# Patient Record
Sex: MALE | Race: WHITE | NOT HISPANIC OR LATINO
[De-identification: names, ages, dates, MRNs, and addresses within clinical notes are randomized per-mention and may not be internally consistent; named-entity substitution may affect disease eponyms.]

---

## 2019-01-14 PROBLEM — Z00.00 ENCOUNTER FOR PREVENTIVE HEALTH EXAMINATION: Status: ACTIVE | Noted: 2019-01-14

## 2019-02-14 ENCOUNTER — APPOINTMENT (OUTPATIENT)
Dept: OTOLARYNGOLOGY | Facility: CLINIC | Age: 64
End: 2019-02-14
Payer: COMMERCIAL

## 2019-02-14 VITALS
BODY MASS INDEX: 26.92 KG/M2 | WEIGHT: 188 LBS | HEIGHT: 70 IN | DIASTOLIC BLOOD PRESSURE: 70 MMHG | SYSTOLIC BLOOD PRESSURE: 110 MMHG | OXYGEN SATURATION: 98 % | HEART RATE: 70 BPM

## 2019-02-14 DIAGNOSIS — Z87.891 PERSONAL HISTORY OF NICOTINE DEPENDENCE: ICD-10-CM

## 2019-02-14 DIAGNOSIS — Z80.0 FAMILY HISTORY OF MALIGNANT NEOPLASM OF DIGESTIVE ORGANS: ICD-10-CM

## 2019-02-14 DIAGNOSIS — Z80.3 FAMILY HISTORY OF MALIGNANT NEOPLASM OF BREAST: ICD-10-CM

## 2019-02-14 DIAGNOSIS — Z80.42 FAMILY HISTORY OF MALIGNANT NEOPLASM OF PROSTATE: ICD-10-CM

## 2019-02-14 PROCEDURE — 99243 OFF/OP CNSLTJ NEW/EST LOW 30: CPT

## 2019-02-14 RX ORDER — MULTIVIT-MIN/FOLIC/VIT K/LYCOP 400-300MCG
50 MCG TABLET ORAL
Refills: 0 | Status: ACTIVE | COMMUNITY

## 2019-02-14 RX ORDER — VITAMIN B COMPLEX
TABLET ORAL
Refills: 0 | Status: ACTIVE | COMMUNITY

## 2019-02-14 RX ORDER — OMEPRAZOLE 40 MG/1
40 CAPSULE, DELAYED RELEASE ORAL
Refills: 0 | Status: ACTIVE | COMMUNITY

## 2019-02-14 RX ORDER — RANITIDINE 150 MG/1
150 TABLET ORAL
Refills: 0 | Status: ACTIVE | COMMUNITY

## 2019-02-14 NOTE — CONSULT LETTER
[Dear  ___] : Dear  [unfilled], [Consult Letter:] : I had the pleasure of evaluating your patient, [unfilled]. [Please see my note below.] : Please see my note below. [Consult Closing:] : Thank you very much for allowing me to participate in the care of this patient.  If you have any questions, please do not hesitate to contact me. [Sincerely,] : Sincerely, [FreeTextEntry2] : Presley Mckeon MD [FreeTextEntry3] : Timothy Nuñez MD, FACS\par Clinical \par Dept. of Otolaryngology\par [facility]\par

## 2019-02-14 NOTE — ASSESSMENT
[FreeTextEntry1] : Will get Audio and tymp.  Pt to see Rose Wood for a tinnitus management eval.  If there is any asymmetry on the audiogram pt may need an MRI of the IAC's.  \par \par If dizziness continues to be an issue we will obtain an ENG.

## 2019-02-14 NOTE — HISTORY OF PRESENT ILLNESS
[de-identified] : Age: 63\par Gender: M\par Main complaint: Tinnitus.  It comes and goes.  \par Associated symptoms: Mild hearing loss and dizziness\par Duration/Timing: Past year.\par Severity of symptoms: Severe\par Modifying factors: Tried Lipoflavinoids.  Did not help\par \par \par

## 2019-04-22 ENCOUNTER — RESULT REVIEW (OUTPATIENT)
Age: 64
End: 2019-04-22

## 2019-05-28 ENCOUNTER — RESULT REVIEW (OUTPATIENT)
Age: 64
End: 2019-05-28

## 2020-03-02 ENCOUNTER — APPOINTMENT (OUTPATIENT)
Dept: OTOLARYNGOLOGY | Facility: CLINIC | Age: 65
End: 2020-03-02
Payer: COMMERCIAL

## 2020-03-02 VITALS
BODY MASS INDEX: 29.35 KG/M2 | HEIGHT: 70 IN | SYSTOLIC BLOOD PRESSURE: 122 MMHG | WEIGHT: 205 LBS | DIASTOLIC BLOOD PRESSURE: 60 MMHG

## 2020-03-02 DIAGNOSIS — R42 DIZZINESS AND GIDDINESS: ICD-10-CM

## 2020-03-02 DIAGNOSIS — H90.5 UNSPECIFIED SENSORINEURAL HEARING LOSS: ICD-10-CM

## 2020-03-02 DIAGNOSIS — R09.89 OTHER SPECIFIED SYMPTOMS AND SIGNS INVOLVING THE CIRCULATORY AND RESPIRATORY SYSTEMS: ICD-10-CM

## 2020-03-02 DIAGNOSIS — H93.13 TINNITUS, BILATERAL: ICD-10-CM

## 2020-03-02 DIAGNOSIS — K21.9 GASTRO-ESOPHAGEAL REFLUX DISEASE W/OUT ESOPHAGITIS: ICD-10-CM

## 2020-03-02 PROCEDURE — 99215 OFFICE O/P EST HI 40 MIN: CPT | Mod: 25

## 2020-03-02 PROCEDURE — 31575 DIAGNOSTIC LARYNGOSCOPY: CPT

## 2020-03-02 RX ORDER — ATORVASTATIN CALCIUM 10 MG/1
10 TABLET, FILM COATED ORAL
Refills: 0 | Status: ACTIVE | COMMUNITY

## 2020-03-02 NOTE — HISTORY OF PRESENT ILLNESS
[de-identified] : Pt is here with a new complaint of snoring and L sided throat discomfort and a globus sensation. Symptoms have been present for the past few years and are getting worse.  Pt continues to c/o hearing loss and tinnitus.  He also reports intermittent vertigo symptoms.

## 2020-03-02 NOTE — PROCEDURE
[Unable to Cooperate with Mirror] : patient unable to cooperate with mirror [Oxymetazoline HCl] : oxymetazoline HCl [Topical Lidocaine] : topical lidocaine [Globus] : globus [Flexible Endoscope] : examined with the flexible endoscope [Serial Number: ___] : Serial Number: [unfilled] [True Vocal Cords Paralysis] : no true vocal cord paralysis [True Vocal Cords Erythematous] : no true vocal cord edema [True Vocal Cords Hernadez's Nodules] : no true vocal cord nodules [Glottis Arytenoid Cartilages] : no arytenoid ulcerations [Glottis Arytenoid Cartilages Erythema] : no arytenoid erythema [Normal] : posterior cricoid area had healthy pink mucosa in the interarytenoid area and the esophageal inlet [Present] : absent

## 2020-03-02 NOTE — CONSULT LETTER
[Dear  ___] : Dear  [unfilled], [Courtesy Letter:] : I had the pleasure of seeing your patient, [unfilled], in my office today. [Please see my note below.] : Please see my note below. [FreeTextEntry2] : Presley Mckeon MD [Consult Closing:] : Thank you very much for allowing me to participate in the care of this patient.  If you have any questions, please do not hesitate to contact me. [Sincerely,] : Sincerely, [FreeTextEntry3] : Timothy Nuñez MD, FACS\par \par Dept. of Otolaryngology and Head & Neck Surgery\par [facility]\par  [DrLance  ___] : Dr. CARLIN

## 2022-04-15 ENCOUNTER — APPOINTMENT (OUTPATIENT)
Dept: ORTHOPEDIC SURGERY | Facility: CLINIC | Age: 67
End: 2022-04-15

## 2022-09-27 ENCOUNTER — APPOINTMENT (OUTPATIENT)
Dept: ORTHOPEDIC SURGERY | Facility: CLINIC | Age: 67
End: 2022-09-27

## 2022-09-27 VITALS — WEIGHT: 195 LBS | HEIGHT: 70 IN | BODY MASS INDEX: 27.92 KG/M2

## 2022-09-27 DIAGNOSIS — Z78.9 OTHER SPECIFIED HEALTH STATUS: ICD-10-CM

## 2022-09-27 PROCEDURE — 99072 ADDL SUPL MATRL&STAF TM PHE: CPT

## 2022-09-27 PROCEDURE — 99215 OFFICE O/P EST HI 40 MIN: CPT

## 2022-09-27 PROCEDURE — 73564 X-RAY EXAM KNEE 4 OR MORE: CPT | Mod: 50

## 2022-09-27 RX ORDER — ROSUVASTATIN CALCIUM 5 MG/1
TABLET, FILM COATED ORAL
Refills: 0 | Status: ACTIVE | COMMUNITY

## 2022-09-27 NOTE — ASSESSMENT
[FreeTextEntry1] : ADVANCED OA L KNEE WITH VARUS ALIGNMENT DUE TO WORK RELATED. UPDATED XRAYS SHOW BILATERAL KNEE OA WITH VARUS DEFORMITY. CONTINUED PAIN, SOME RELIEF WITH ORTHOVISC. WE AGAIN DISCUSSED TREATMENT OPTIONS. WILL AUTH FOR B/L ORTHOVISC. THIS INJECTION IS MEDICALLY NECESSARY DUE TO SEVERE PAIN AND OA.

## 2022-09-27 NOTE — PHYSICAL EXAM
[Bilateral] : knee bilaterally [5___] : hamstring 5[unfilled]/5 [] : patient ambulates without assistive device [TWNoteComboBox7] : flexion 130 degrees [de-identified] : extension 0 degrees

## 2022-11-15 ENCOUNTER — APPOINTMENT (OUTPATIENT)
Dept: ORTHOPEDIC SURGERY | Facility: CLINIC | Age: 67
End: 2022-11-15

## 2022-11-15 PROCEDURE — 99072 ADDL SUPL MATRL&STAF TM PHE: CPT

## 2022-11-15 PROCEDURE — 99214 OFFICE O/P EST MOD 30 MIN: CPT | Mod: 25

## 2022-11-15 PROCEDURE — 20610 DRAIN/INJ JOINT/BURSA W/O US: CPT | Mod: 50

## 2022-11-15 NOTE — HISTORY OF PRESENT ILLNESS
[8] : 8 [2] : 2 [Intermittent] : intermittent [Household chores] : household chores [Injection therapy] : injection therapy [Walking] : walking [Retired] : Work status: retired [] : yes [de-identified] : 11/15/22 FOLLOW UP WORKERS COMP  BL KNEES  [FreeTextEntry1] : BILATERAL KNEES  [de-identified] : NOTHING

## 2022-11-15 NOTE — PROCEDURE
[de-identified] : Procedure Name: Orthovisc (Large Joint)\par \par Viscosupplementation Injection: X-ray evidence of Osteoarthritis on this or prior visit, Patient has tried OTC's including aspirin, Ibuprofen, Aleve etc or prescription NSAIDS, and/or exercises at home and/ or physical therapy without satisfactory response and Repeat series performed because patient had significant improvement in their pain and functional capacity from prior series which was given more than six months ago. \par \par An injection of Orthovisc 2ml #1 was injected into the bilateral knee(s). The risks, benefits, and alternatives to Viscosupplementation injection were explained in full to the patient. Risks outlined include but are not limited to infection, sepsis, bleeding, scarring, skin discoloration, temporary increase in pain, syncopal episode, failure to resolve symptoms, allergic reaction, and symptom recurrence. Signs and symptoms of infection reviewed and patient advised to call immediately for redness, fevers, and/or chills. Patient understood the risks. All questions were answered. After discussion of options, patient requested Viscosupplementation. Oral informed consent was obtained and sterile prep was done of the injection site. The patient tolerated the procedure well. Ice tonight to the injection site. \par

## 2022-11-15 NOTE — ASSESSMENT
[FreeTextEntry1] : ADVANCED OA L KNEE WITH VARUS ALIGNMENT DUE TO WORK RELATED. UPDATED XRAYS SHOW BILATERAL KNEE OA WITH VARUS DEFORMITY. CONTINUED PAIN, SOME RELIEF WITH ORTHOVISC. WE AGAIN DISCUSSED TREATMENT OPTIONS. \par B/L KNEE ORTHOVISC #1 TODAY. PATIENT TOLERATED INJECTION WELL.

## 2022-11-15 NOTE — PHYSICAL EXAM
[Bilateral] : knee bilaterally [5___] : hamstring 5[unfilled]/5 [] : no erythema [TWNoteComboBox7] : flexion 130 degrees [de-identified] : extension 0 degrees

## 2022-11-22 ENCOUNTER — APPOINTMENT (OUTPATIENT)
Dept: ORTHOPEDIC SURGERY | Facility: CLINIC | Age: 67
End: 2022-11-22

## 2022-11-22 PROCEDURE — 99214 OFFICE O/P EST MOD 30 MIN: CPT | Mod: 25

## 2022-11-22 PROCEDURE — 99072 ADDL SUPL MATRL&STAF TM PHE: CPT

## 2022-11-22 PROCEDURE — 20610 DRAIN/INJ JOINT/BURSA W/O US: CPT | Mod: 50

## 2022-11-22 NOTE — PROCEDURE
[de-identified] : Procedure Name: Orthovisc (Large Joint)\par \par Viscosupplementation Injection: X-ray evidence of Osteoarthritis on this or prior visit, Patient has tried OTC's including aspirin, Ibuprofen, Aleve etc or prescription NSAIDS, and/or exercises at home and/ or physical therapy without satisfactory response and Repeat series performed because patient had significant improvement in their pain and functional capacity from prior series which was given more than six months ago. \par \par An injection of Orthovisc 2ml #2 was injected into the bilateral knee(s). The risks, benefits, and alternatives to Viscosupplementation injection were explained in full to the patient. Risks outlined include but are not limited to infection, sepsis, bleeding, scarring, skin discoloration, temporary increase in pain, syncopal episode, failure to resolve symptoms, allergic reaction, and symptom recurrence. Signs and symptoms of infection reviewed and patient advised to call immediately for redness, fevers, and/or chills. Patient understood the risks. All questions were answered. After discussion of options, patient requested Viscosupplementation. Oral informed consent was obtained and sterile prep was done of the injection site. The patient tolerated the procedure well. Ice tonight to the injection site. \par

## 2022-11-22 NOTE — PHYSICAL EXAM
[Bilateral] : knee bilaterally [5___] : hamstring 5[unfilled]/5 [] : no erythema [TWNoteComboBox7] : flexion 130 degrees [de-identified] : extension 0 degrees

## 2022-11-22 NOTE — HISTORY OF PRESENT ILLNESS
[8] : 8 [2] : 2 [Intermittent] : intermittent [Household chores] : household chores [Injection therapy] : injection therapy [Walking] : walking [Retired] : Work status: retired [] : yes [de-identified] : 11/15/22 FOLLOW UP WORKERS COMP  BL KNEES \par \par 11/22/22 bl knees orthovisc injections # 2  [FreeTextEntry1] : BILATERAL KNEES  [de-identified] : orthovisc injections

## 2022-11-29 ENCOUNTER — APPOINTMENT (OUTPATIENT)
Dept: ORTHOPEDIC SURGERY | Facility: CLINIC | Age: 67
End: 2022-11-29

## 2022-11-29 PROCEDURE — 99214 OFFICE O/P EST MOD 30 MIN: CPT | Mod: 25

## 2022-11-29 PROCEDURE — 20610 DRAIN/INJ JOINT/BURSA W/O US: CPT | Mod: 50

## 2022-11-29 PROCEDURE — 99072 ADDL SUPL MATRL&STAF TM PHE: CPT

## 2022-11-29 NOTE — HISTORY OF PRESENT ILLNESS
[8] : 8 [2] : 2 [Intermittent] : intermittent [Household chores] : household chores [Injection therapy] : injection therapy [Walking] : walking [Retired] : Work status: retired [] : yes [de-identified] : 11/15/22 FOLLOW UP WORKERS COMP  BL KNEES \par \par 11/22/22 bl knees orthovisc injections # 2 \par \par 11/29/22 bl knees orthovisc injections # 3  [FreeTextEntry1] : BILATERAL KNEES  [de-identified] : orthovisc injections

## 2022-11-29 NOTE — PHYSICAL EXAM
[Bilateral] : knee bilaterally [5___] : hamstring 5[unfilled]/5 [] : no erythema [TWNoteComboBox7] : flexion 130 degrees [de-identified] : extension 0 degrees

## 2022-11-29 NOTE — ASSESSMENT
[FreeTextEntry1] : ADVANCED OA L KNEE WITH VARUS ALIGNMENT DUE TO WORK RELATED. UPDATED XRAYS SHOW BILATERAL KNEE OA WITH VARUS DEFORMITY. CONTINUED PAIN, SOME RELIEF WITH ORTHOVISC. WE AGAIN DISCUSSED TREATMENT OPTIONS. \par B/L KNEE ORTHOVISC #3 TODAY. PATIENT TOLERATED INJECTION WELL.

## 2022-11-29 NOTE — PROCEDURE
[de-identified] : Procedure Name: Orthovisc (Large Joint)\par \par Viscosupplementation Injection: X-ray evidence of Osteoarthritis on this or prior visit, Patient has tried OTC's including aspirin, Ibuprofen, Aleve etc or prescription NSAIDS, and/or exercises at home and/ or physical therapy without satisfactory response and Repeat series performed because patient had significant improvement in their pain and functional capacity from prior series which was given more than six months ago. \par \par An injection of Orthovisc 2ml #3 was injected into the bilateral knee(s). The risks, benefits, and alternatives to Viscosupplementation injection were explained in full to the patient. Risks outlined include but are not limited to infection, sepsis, bleeding, scarring, skin discoloration, temporary increase in pain, syncopal episode, failure to resolve symptoms, allergic reaction, and symptom recurrence. Signs and symptoms of infection reviewed and patient advised to call immediately for redness, fevers, and/or chills. Patient understood the risks. All questions were answered. After discussion of options, patient requested Viscosupplementation. Oral informed consent was obtained and sterile prep was done of the injection site. The patient tolerated the procedure well. Ice tonight to the injection site. \par

## 2022-12-06 ENCOUNTER — APPOINTMENT (OUTPATIENT)
Dept: ORTHOPEDIC SURGERY | Facility: CLINIC | Age: 67
End: 2022-12-06

## 2022-12-06 PROCEDURE — 20610 DRAIN/INJ JOINT/BURSA W/O US: CPT | Mod: 50

## 2022-12-06 PROCEDURE — 99072 ADDL SUPL MATRL&STAF TM PHE: CPT

## 2022-12-06 PROCEDURE — 99214 OFFICE O/P EST MOD 30 MIN: CPT | Mod: 25

## 2022-12-06 NOTE — PROCEDURE
[de-identified] : Procedure Name: Orthovisc (Large Joint)\par \par Viscosupplementation Injection: X-ray evidence of Osteoarthritis on this or prior visit, Patient has tried OTC's including aspirin, Ibuprofen, Aleve etc or prescription NSAIDS, and/or exercises at home and/ or physical therapy without satisfactory response and Repeat series performed because patient had significant improvement in their pain and functional capacity from prior series which was given more than six months ago. \par \par An injection of Orthovisc 2ml #4 was injected into the bilateral knee(s). The risks, benefits, and alternatives to Viscosupplementation injection were explained in full to the patient. Risks outlined include but are not limited to infection, sepsis, bleeding, scarring, skin discoloration, temporary increase in pain, syncopal episode, failure to resolve symptoms, allergic reaction, and symptom recurrence. Signs and symptoms of infection reviewed and patient advised to call immediately for redness, fevers, and/or chills. Patient understood the risks. All questions were answered. After discussion of options, patient requested Viscosupplementation. Oral informed consent was obtained and sterile prep was done of the injection site. The patient tolerated the procedure well. Ice tonight to the injection site. \par

## 2022-12-06 NOTE — HISTORY OF PRESENT ILLNESS
[8] : 8 [2] : 2 [Intermittent] : intermittent [Household chores] : household chores [Injection therapy] : injection therapy [Walking] : walking [Retired] : Work status: retired [] : yes [de-identified] : 11/15/22 FOLLOW UP WORKERS COMP  BL KNEES \par \par 11/22/22 bl knees orthovisc injections # 2 \par \par 11/29/22 bl knees orthovisc injections # 3 \par \par 12/06/22 bl knees orthovisc # 4 [FreeTextEntry1] : BILATERAL KNEES  [de-identified] : orthovisc injections

## 2022-12-06 NOTE — ASSESSMENT
[FreeTextEntry1] : ADVANCED OA L KNEE WITH VARUS ALIGNMENT DUE TO WORK RELATED. UPDATED XRAYS SHOW BILATERAL KNEE OA WITH VARUS DEFORMITY. CONTINUED PAIN, SOME RELIEF WITH ORTHOVISC. WE AGAIN DISCUSSED TREATMENT OPTIONS. \par B/L KNEE ORTHOVISC #4 TODAY. PATIENT TOLERATED INJECTION WELL.

## 2022-12-06 NOTE — PHYSICAL EXAM
[Bilateral] : knee bilaterally [5___] : hamstring 5[unfilled]/5 [] : no erythema [TWNoteComboBox7] : flexion 130 degrees [de-identified] : extension 0 degrees

## 2023-08-15 ENCOUNTER — APPOINTMENT (OUTPATIENT)
Dept: ORTHOPEDIC SURGERY | Facility: CLINIC | Age: 68
End: 2023-08-15
Payer: OTHER MISCELLANEOUS

## 2023-08-15 VITALS — WEIGHT: 195 LBS | BODY MASS INDEX: 27.92 KG/M2 | HEIGHT: 70 IN

## 2023-08-15 PROCEDURE — 20610 DRAIN/INJ JOINT/BURSA W/O US: CPT | Mod: 50

## 2023-08-15 PROCEDURE — 99215 OFFICE O/P EST HI 40 MIN: CPT | Mod: 25

## 2023-08-15 NOTE — ASSESSMENT
[FreeTextEntry1] : ADVANCED OA B/L KNEE WITH VARUS ALIGNMENT DUE TO WORK RELATED INJURY. UPDATED XRAYS SHOW BILATERAL KNEE OA WITH VARUS DEFORMITY. CONTINUED PAIN, WE AGAIN DISCUSSED TREATMENT OPTIONS. COMPLETED ORTHOVISC SERIES 12/6/22 WITH SOME RELIEF. QUESTIONS ANSWERED.   B/L KNEE ORTHOVISC #1 TODAY. PATIENT TOLERATED INJECTION WELL.

## 2023-08-15 NOTE — PHYSICAL EXAM
[Bilateral] : knee bilaterally [5___] : hamstring 5[unfilled]/5 [] : no erythema [TWNoteComboBox7] : flexion 130 degrees [de-identified] : extension 0 degrees

## 2023-08-15 NOTE — HISTORY OF PRESENT ILLNESS
[8] : 8 [2] : 2 [Intermittent] : intermittent [Household chores] : household chores [Injection therapy] : injection therapy [Walking] : walking [Retired] : Work status: retired [] : yes [de-identified] : 11/15/22 FOLLOW UP WORKERS COMP  BL KNEES   11/22/22 bl knees orthovisc injections # 2   11/29/22 bl knees orthovisc injections # 3   12/06/22 bl knees orthovisc # 4  8/15/23 Pt is here for bilateral knee follow up. Pain worsened recently and then got better, L>R. Finished orthovisc on 12/6/22 with good relief.  [FreeTextEntry1] : BILATERAL KNEES  [de-identified] : orthovisc injections

## 2023-08-15 NOTE — PROCEDURE
[de-identified] : Procedure Name: Orthovisc (Large Joint)  Viscosupplementation Injection: X-ray evidence of Osteoarthritis on this or prior visit, Patient has tried OTC's including aspirin, Ibuprofen, Aleve etc or prescription NSAIDS, and/or exercises at home and/ or physical therapy without satisfactory response and Repeat series performed because patient had significant improvement in their pain and functional capacity from prior series which was given more than six months ago.   An injection of Orthovisc 2ml #1 was injected into the bilateral knee(s). The risks, benefits, and alternatives to Viscosupplementation injection were explained in full to the patient. Risks outlined include but are not limited to infection, sepsis, bleeding, scarring, skin discoloration, temporary increase in pain, syncopal episode, failure to resolve symptoms, allergic reaction, and symptom recurrence. Signs and symptoms of infection reviewed and patient advised to call immediately for redness, fevers, and/or chills. Patient understood the risks. All questions were answered. After discussion of options, patient requested Viscosupplementation. Oral informed consent was obtained and sterile prep was done of the injection site. The patient tolerated the procedure well. Ice tonight to the injection site.

## 2023-08-22 ENCOUNTER — APPOINTMENT (OUTPATIENT)
Dept: ORTHOPEDIC SURGERY | Facility: CLINIC | Age: 68
End: 2023-08-22
Payer: OTHER MISCELLANEOUS

## 2023-08-22 VITALS — BODY MASS INDEX: 27.92 KG/M2 | WEIGHT: 195 LBS | HEIGHT: 70 IN

## 2023-08-22 PROCEDURE — 20610 DRAIN/INJ JOINT/BURSA W/O US: CPT | Mod: 50

## 2023-08-22 PROCEDURE — 99214 OFFICE O/P EST MOD 30 MIN: CPT | Mod: ACP,25

## 2023-08-22 NOTE — PHYSICAL EXAM
[Bilateral] : knee bilaterally [5___] : hamstring 5[unfilled]/5 [] : no erythema [TWNoteComboBox7] : flexion 130 degrees [de-identified] : extension 0 degrees

## 2023-08-22 NOTE — ASSESSMENT
[FreeTextEntry1] : ADVANCED OA B/L KNEE WITH VARUS ALIGNMENT DUE TO WORK RELATED INJURY. UPDATED XRAYS SHOW BILATERAL KNEE OA WITH VARUS DEFORMITY. CONTINUED PAIN, WE AGAIN DISCUSSED TREATMENT OPTIONS. COMPLETED ORTHOVISC SERIES 12/6/22 WITH SOME RELIEF. QUESTIONS ANSWERED.   B/L KNEE ORTHOVISC #2 TODAY. PATIENT TOLERATED INJECTION WELL.

## 2023-08-22 NOTE — PROCEDURE
[de-identified] : Procedure Name: Orthovisc (Large Joint)  Viscosupplementation Injection: X-ray evidence of Osteoarthritis on this or prior visit, Patient has tried OTC's including aspirin, Ibuprofen, Aleve etc or prescription NSAIDS, and/or exercises at home and/ or physical therapy without satisfactory response and Repeat series performed because patient had significant improvement in their pain and functional capacity from prior series which was given more than six months ago.   An injection of Orthovisc 2ml #2 was injected into the bilateral knee(s). The risks, benefits, and alternatives to Viscosupplementation injection were explained in full to the patient. Risks outlined include but are not limited to infection, sepsis, bleeding, scarring, skin discoloration, temporary increase in pain, syncopal episode, failure to resolve symptoms, allergic reaction, and symptom recurrence. Signs and symptoms of infection reviewed and patient advised to call immediately for redness, fevers, and/or chills. Patient understood the risks. All questions were answered. After discussion of options, patient requested Viscosupplementation. Oral informed consent was obtained and sterile prep was done of the injection site. The patient tolerated the procedure well. Ice tonight to the injection site.

## 2023-08-22 NOTE — HISTORY OF PRESENT ILLNESS
[8] : 8 [2] : 2 [Intermittent] : intermittent [Household chores] : household chores [Injection therapy] : injection therapy [Walking] : walking [Retired] : Work status: retired [] : yes [de-identified] : 11/15/22 FOLLOW UP WORKERS COMP  BL KNEES   11/22/22 bl knees orthovisc injections # 2   11/29/22 bl knees orthovisc injections # 3   12/06/22 bl knees orthovisc # 4  8/15/23 Pt is here for bilateral knee follow up. Pain worsened recently and then got better, L>R. Finished orthovisc on 12/6/22 with good relief.   08/22/23 BL KNEES ORTHOVISC # 2 [FreeTextEntry1] : BILATERAL KNEES  [de-identified] : orthovisc injections

## 2023-08-29 ENCOUNTER — APPOINTMENT (OUTPATIENT)
Dept: ORTHOPEDIC SURGERY | Facility: CLINIC | Age: 68
End: 2023-08-29
Payer: OTHER MISCELLANEOUS

## 2023-08-29 VITALS — WEIGHT: 195 LBS | HEIGHT: 70 IN | BODY MASS INDEX: 27.92 KG/M2

## 2023-08-29 PROCEDURE — 99213 OFFICE O/P EST LOW 20 MIN: CPT | Mod: 25

## 2023-08-29 PROCEDURE — 20610 DRAIN/INJ JOINT/BURSA W/O US: CPT | Mod: 50

## 2023-08-29 NOTE — HISTORY OF PRESENT ILLNESS
[8] : 8 [2] : 2 [Intermittent] : intermittent [Household chores] : household chores [Injection therapy] : injection therapy [Walking] : walking [Retired] : Work status: retired [] : yes [de-identified] : 11/15/22 FOLLOW UP WORKERS COMP  BL KNEES   11/22/22 bl knees orthovisc injections # 2   11/29/22 bl knees orthovisc injections # 3   12/06/22 bl knees orthovisc # 4  8/15/23 Pt is here for bilateral knee follow up. Pain worsened recently and then got better, L>R. Finished orthovisc on 12/6/22 with good relief.   08/22/23 BL KNEES ORTHOVISC # 2  08/29/23 bl knees orthovisc # 3  [FreeTextEntry1] : BILATERAL KNEES  [de-identified] : orthovisc injections

## 2023-08-29 NOTE — PHYSICAL EXAM
[Bilateral] : knee bilaterally [5___] : hamstring 5[unfilled]/5 [] : no erythema [TWNoteComboBox7] : flexion 130 degrees [de-identified] : extension 0 degrees

## 2023-08-29 NOTE — PROCEDURE
[de-identified] : Procedure Name: Orthovisc (Large Joint)  Viscosupplementation Injection: X-ray evidence of Osteoarthritis on this or prior visit, Patient has tried OTC's including aspirin, Ibuprofen, Aleve etc or prescription NSAIDS, and/or exercises at home and/ or physical therapy without satisfactory response and Repeat series performed because patient had significant improvement in their pain and functional capacity from prior series which was given more than six months ago.   An injection of Orthovisc 2ml #3 was injected into the bilateral knee(s). The risks, benefits, and alternatives to Viscosupplementation injection were explained in full to the patient. Risks outlined include but are not limited to infection, sepsis, bleeding, scarring, skin discoloration, temporary increase in pain, syncopal episode, failure to resolve symptoms, allergic reaction, and symptom recurrence. Signs and symptoms of infection reviewed and patient advised to call immediately for redness, fevers, and/or chills. Patient understood the risks. All questions were answered. After discussion of options, patient requested Viscosupplementation. Oral informed consent was obtained and sterile prep was done of the injection site. The patient tolerated the procedure well. Ice tonight to the injection site.

## 2023-08-29 NOTE — ASSESSMENT
[FreeTextEntry1] : ADVANCED OA B/L KNEE WITH VARUS ALIGNMENT DUE TO WORK RELATED INJURY. UPDATED XRAYS SHOW BILATERAL KNEE OA WITH VARUS DEFORMITY. CONTINUED PAIN, WE AGAIN DISCUSSED TREATMENT OPTIONS. COMPLETED ORTHOVISC SERIES 12/6/22 WITH SOME RELIEF. QUESTIONS ANSWERED.   B/L KNEE ORTHOVISC #3 TODAY. PATIENT TOLERATED INJECTION WELL.

## 2023-09-05 ENCOUNTER — APPOINTMENT (OUTPATIENT)
Dept: ORTHOPEDIC SURGERY | Facility: CLINIC | Age: 68
End: 2023-09-05
Payer: OTHER MISCELLANEOUS

## 2023-09-05 PROCEDURE — 99214 OFFICE O/P EST MOD 30 MIN: CPT | Mod: 25

## 2023-09-05 PROCEDURE — 20610 DRAIN/INJ JOINT/BURSA W/O US: CPT | Mod: 50

## 2023-09-05 NOTE — PROCEDURE
[de-identified] : Procedure Name: Orthovisc (Large Joint)  Viscosupplementation Injection: X-ray evidence of Osteoarthritis on this or prior visit, Patient has tried OTC's including aspirin, Ibuprofen, Aleve etc or prescription NSAIDS, and/or exercises at home and/ or physical therapy without satisfactory response and Repeat series performed because patient had significant improvement in their pain and functional capacity from prior series which was given more than six months ago.   An injection of Orthovisc 2ml #4 was injected into the bilateral knee(s). The risks, benefits, and alternatives to Viscosupplementation injection were explained in full to the patient. Risks outlined include but are not limited to infection, sepsis, bleeding, scarring, skin discoloration, temporary increase in pain, syncopal episode, failure to resolve symptoms, allergic reaction, and symptom recurrence. Signs and symptoms of infection reviewed and patient advised to call immediately for redness, fevers, and/or chills. Patient understood the risks. All questions were answered. After discussion of options, patient requested Viscosupplementation. Oral informed consent was obtained and sterile prep was done of the injection site. The patient tolerated the procedure well. Ice tonight to the injection site.

## 2023-09-05 NOTE — PHYSICAL EXAM
[Bilateral] : knee bilaterally [5___] : hamstring 5[unfilled]/5 [] : no erythema [TWNoteComboBox7] : flexion 130 degrees [de-identified] : extension 0 degrees

## 2023-09-05 NOTE — HISTORY OF PRESENT ILLNESS
[8] : 8 [2] : 2 [Intermittent] : intermittent [Household chores] : household chores [Injection therapy] : injection therapy [Walking] : walking [Retired] : Work status: retired [] : yes [de-identified] : 11/15/22 FOLLOW UP WORKERS COMP  BL KNEES   11/22/22 bl knees orthovisc injections # 2   11/29/22 bl knees orthovisc injections # 3   12/06/22 bl knees orthovisc # 4  8/15/23 Pt is here for bilateral knee follow up. Pain worsened recently and then got better, L>R. Finished orthovisc on 12/6/22 with good relief.   08/22/23 BL KNEES ORTHOVISC # 2  08/29/23 bl knees orthovisc # 3   09/05/23 bl knees orthovisc # 4  [FreeTextEntry1] : BILATERAL KNEES  [de-identified] : orthovisc injections

## 2023-09-05 NOTE — ASSESSMENT
[FreeTextEntry1] : ADVANCED OA B/L KNEE WITH VARUS ALIGNMENT DUE TO WORK RELATED INJURY. UPDATED XRAYS SHOW BILATERAL KNEE OA WITH VARUS DEFORMITY. CONTINUED PAIN, WE AGAIN DISCUSSED TREATMENT OPTIONS. COMPLETED ORTHOVISC SERIES 12/6/22 WITH SOME RELIEF. QUESTIONS ANSWERED.   B/L KNEE ORTHOVISC #4 TODAY. PATIENT TOLERATED INJECTION WELL.

## 2023-10-10 ENCOUNTER — APPOINTMENT (OUTPATIENT)
Dept: ORTHOPEDIC SURGERY | Facility: CLINIC | Age: 68
End: 2023-10-10
Payer: OTHER MISCELLANEOUS

## 2023-10-10 VITALS — BODY MASS INDEX: 27.92 KG/M2 | HEIGHT: 70 IN | WEIGHT: 195 LBS

## 2023-10-10 PROCEDURE — 99215 OFFICE O/P EST HI 40 MIN: CPT

## 2023-10-31 ENCOUNTER — APPOINTMENT (OUTPATIENT)
Dept: ORTHOPEDIC SURGERY | Facility: CLINIC | Age: 68
End: 2023-10-31

## 2024-03-12 ENCOUNTER — APPOINTMENT (OUTPATIENT)
Dept: ORTHOPEDIC SURGERY | Facility: CLINIC | Age: 69
End: 2024-03-12
Payer: OTHER MISCELLANEOUS

## 2024-03-12 VITALS — HEIGHT: 70 IN | WEIGHT: 195 LBS | BODY MASS INDEX: 27.92 KG/M2

## 2024-03-12 DIAGNOSIS — M17.11 UNILATERAL PRIMARY OSTEOARTHRITIS, RIGHT KNEE: ICD-10-CM

## 2024-03-12 PROCEDURE — 99214 OFFICE O/P EST MOD 30 MIN: CPT

## 2024-03-12 NOTE — ASSESSMENT
[FreeTextEntry1] : ADVANCED OA B/L KNEE WITH VARUS ALIGNMENT DUE TO WORK RELATED INJURY. UPDATED XRAYS SHOW BILATERAL KNEE OA WITH VARUS DEFORMITY. CONTINUED PAIN, WE AGAIN DISCUSSED TREATMENT OPTIONS. COMPLETED ORTHOVISC SERIES 9/5/23 WITH MINIMAL RELIEF. QUESTIONS ANSWERED.   WILL ORDER LT KNEE MRI TO EVAL FOR SCE. FOLLOW UP AFTER

## 2024-03-12 NOTE — HISTORY OF PRESENT ILLNESS
[Work related] : work related [Sudden] : sudden [8] : 8 [2] : 2 [Localized] : localized [Intermittent] : intermittent [Household chores] : household chores [Injection therapy] : injection therapy [Walking] : walking [Retired] : Work status: retired [] : yes [de-identified] : 11/15/22 FOLLOW UP WORKERS COMP  BL KNEES   11/22/22 bl knees orthovisc injections # 2   11/29/22 bl knees orthovisc injections # 3   12/06/22 bl knees orthovisc # 4  8/15/23 Pt is here for bilateral knee follow up. Pain worsened recently and then got better, L>R. Finished orthovisc on 12/6/22 with good relief.   08/22/23 BL KNEES ORTHOVISC # 2  08/29/23 bl knees orthovisc # 3   09/05/23 bl knees orthovisc # 4   10/10/23 follow up bl knees finished series of orthovisc last visit which only gave relief for 1 month.   3/12/24: Follow up b/l knees. Left knee MRI was approved. Pain severe at times [FreeTextEntry1] : BILATERAL KNEES  [de-identified] : orthovisc injections

## 2024-03-12 NOTE — PHYSICAL EXAM
[Bilateral] : knee bilaterally [5___] : hamstring 5[unfilled]/5 [] : no erythema [TWNoteComboBox7] : flexion 130 degrees [de-identified] : extension 0 degrees

## 2024-03-15 ENCOUNTER — APPOINTMENT (OUTPATIENT)
Dept: MRI IMAGING | Facility: CLINIC | Age: 69
End: 2024-03-15
Payer: OTHER MISCELLANEOUS

## 2024-03-15 PROCEDURE — 73721 MRI JNT OF LWR EXTRE W/O DYE: CPT | Mod: LT

## 2024-03-19 ENCOUNTER — APPOINTMENT (OUTPATIENT)
Dept: ORTHOPEDIC SURGERY | Facility: CLINIC | Age: 69
End: 2024-03-19
Payer: OTHER MISCELLANEOUS

## 2024-03-19 DIAGNOSIS — M84.469A PATHOLOGICAL FRACTURE, UNSPECIFIED TIBIA AND FIBULA, INITIAL ENCOUNTER FOR FRACTURE: ICD-10-CM

## 2024-03-19 DIAGNOSIS — M17.12 UNILATERAL PRIMARY OSTEOARTHRITIS, LEFT KNEE: ICD-10-CM

## 2024-03-19 PROCEDURE — 99215 OFFICE O/P EST HI 40 MIN: CPT

## 2024-03-19 NOTE — PHYSICAL EXAM
[Bilateral] : knee bilaterally [5___] : hamstring 5[unfilled]/5 [] : no erythema [TWNoteComboBox7] : flexion 130 degrees [de-identified] : extension 0 degrees

## 2024-03-19 NOTE — ASSESSMENT
[FreeTextEntry1] : ADVANCED OA B/L KNEE WITH VARUS ALIGNMENT DUE TO WORK RELATED INJURY. UPDATED XRAYS SHOW BILATERAL KNEE OA WITH VARUS DEFORMITY. CONTINUED PAIN, WE AGAIN DISCUSSED TREATMENT OPTIONS. COMPLETED ORTHOVISC SERIES 9/5/23 WITH MINIMAL RELIEF. QUESTIONS ANSWERED.   LEFT KNEE MRI 3/15/24 REVIEWED WITH ADVANCED MEDIAL OA, VARUS ALIGNMENT WITH SUBCHONDRAL EDEMA MEDIAL TIBIAL PLATEAU.  WILL REQUEST AUTH FOR LT KNEE MEDIAL UNLOADING BRACE. BRACE IS MEDICALLY NECESSARY.  EVENTUAL TKA BRIEFLY DISCUSSED.

## 2024-03-19 NOTE — DATA REVIEWED
[FreeTextEntry1] : LEFT KNEE MRI [facility] 3/15/24: 1. Tricompartmental arthrosis progressed since prior exam with subchondral edema and cysts which appears mild-to-moderate in the medial tibial plateau and mild in the medial patella.  2. No large recurrent medial meniscal tear or lateral meniscal tear.  3. Chronic ACL sprain, chronic MCL sprain with laxity, extensor mechanism tendinopathy, and mild prepatella soft tissue swelling.  4. Mild-to-moderate effusion, synovitis and multiple plicae with small popliteal cyst and mild pes anserine tendinitis.  5. No acute fracture of large loose body.

## 2024-03-19 NOTE — HISTORY OF PRESENT ILLNESS
[Work related] : work related [Sudden] : sudden [2] : 2 [8] : 8 [Localized] : localized [Intermittent] : intermittent [Household chores] : household chores [Injection therapy] : injection therapy [Walking] : walking [Retired] : Work status: retired [] : yes [de-identified] : 11/15/22 FOLLOW UP WORKERS COMP  BL KNEES   11/22/22 bl knees orthovisc injections # 2   11/29/22 bl knees orthovisc injections # 3   12/06/22 bl knees orthovisc # 4  8/15/23 Pt is here for bilateral knee follow up. Pain worsened recently and then got better, L>R. Finished orthovisc on 12/6/22 with good relief.   08/22/23 BL KNEES ORTHOVISC # 2  08/29/23 bl knees orthovisc # 3   09/05/23 bl knees orthovisc # 4   10/10/23 follow up bl knees finished series of orthovisc last visit which only gave relief for 1 month.   3/12/24: Follow up b/l knees. Left knee MRI was approved. Pain severe at times  3/19/2024 Patient is here for a follow up of the knees. [FreeTextEntry1] : BILATERAL KNEES  [de-identified] : orthovisc injections